# Patient Record
Sex: MALE | Race: WHITE | NOT HISPANIC OR LATINO | URBAN - METROPOLITAN AREA
[De-identification: names, ages, dates, MRNs, and addresses within clinical notes are randomized per-mention and may not be internally consistent; named-entity substitution may affect disease eponyms.]

---

## 2023-04-28 ENCOUNTER — OFFICE VISIT (OUTPATIENT)
Dept: INTERNAL MEDICINE | Facility: CLINIC | Age: 23
End: 2023-04-28
Payer: COMMERCIAL

## 2023-04-28 VITALS
TEMPERATURE: 98 F | RESPIRATION RATE: 20 BRPM | BODY MASS INDEX: 17.17 KG/M2 | HEIGHT: 72 IN | HEART RATE: 83 BPM | OXYGEN SATURATION: 98 % | DIASTOLIC BLOOD PRESSURE: 70 MMHG | WEIGHT: 126.75 LBS | SYSTOLIC BLOOD PRESSURE: 110 MMHG

## 2023-04-28 DIAGNOSIS — Z76.89 ENCOUNTER TO ESTABLISH CARE: Primary | ICD-10-CM

## 2023-04-28 DIAGNOSIS — Z00.00 ANNUAL PHYSICAL EXAM: ICD-10-CM

## 2023-04-28 PROCEDURE — 3008F BODY MASS INDEX DOCD: CPT | Mod: CPTII,S$GLB,, | Performed by: FAMILY MEDICINE

## 2023-04-28 PROCEDURE — 99999 PR PBB SHADOW E&M-NEW PATIENT-LVL III: ICD-10-PCS | Mod: PBBFAC,,, | Performed by: FAMILY MEDICINE

## 2023-04-28 PROCEDURE — 3074F SYST BP LT 130 MM HG: CPT | Mod: CPTII,S$GLB,, | Performed by: FAMILY MEDICINE

## 2023-04-28 PROCEDURE — 3078F DIAST BP <80 MM HG: CPT | Mod: CPTII,S$GLB,, | Performed by: FAMILY MEDICINE

## 2023-04-28 PROCEDURE — 99999 PR PBB SHADOW E&M-NEW PATIENT-LVL III: CPT | Mod: PBBFAC,,, | Performed by: FAMILY MEDICINE

## 2023-04-28 PROCEDURE — 99385 PREV VISIT NEW AGE 18-39: CPT | Mod: S$GLB,,, | Performed by: FAMILY MEDICINE

## 2023-04-28 PROCEDURE — 3074F PR MOST RECENT SYSTOLIC BLOOD PRESSURE < 130 MM HG: ICD-10-PCS | Mod: CPTII,S$GLB,, | Performed by: FAMILY MEDICINE

## 2023-04-28 PROCEDURE — 99385 PR PREVENTIVE VISIT,NEW,18-39: ICD-10-PCS | Mod: S$GLB,,, | Performed by: FAMILY MEDICINE

## 2023-04-28 PROCEDURE — 1159F MED LIST DOCD IN RCRD: CPT | Mod: CPTII,S$GLB,, | Performed by: FAMILY MEDICINE

## 2023-04-28 PROCEDURE — 1159F PR MEDICATION LIST DOCUMENTED IN MEDICAL RECORD: ICD-10-PCS | Mod: CPTII,S$GLB,, | Performed by: FAMILY MEDICINE

## 2023-04-28 PROCEDURE — 3078F PR MOST RECENT DIASTOLIC BLOOD PRESSURE < 80 MM HG: ICD-10-PCS | Mod: CPTII,S$GLB,, | Performed by: FAMILY MEDICINE

## 2023-04-28 PROCEDURE — 3008F PR BODY MASS INDEX (BMI) DOCUMENTED: ICD-10-PCS | Mod: CPTII,S$GLB,, | Performed by: FAMILY MEDICINE

## 2023-04-28 RX ORDER — DICYCLOMINE HYDROCHLORIDE 20 MG/1
20 TABLET ORAL EVERY 6 HOURS PRN
COMMUNITY
Start: 2022-10-31 | End: 2023-06-20 | Stop reason: SDUPTHER

## 2023-04-28 NOTE — PROGRESS NOTES
Subjective:       Patient ID: Pelon Stokes is a 23 y.o. male.    Chief Complaint: Abdominal Pain and Establish Care    Abdominal Pain      22 yo M    Past Surgical History:   Procedure Laterality Date    APPENDECTOMY       Family History   Problem Relation Age of Onset    Lung cancer Maternal Grandmother         nonsmoker    Brain cancer Maternal Grandfather     COPD Paternal Grandmother      Social History     Tobacco Use   Smoking Status Some Days    Types: Vaping with nicotine    Start date: 2018   Smokeless Tobacco Never     Not heavy drinker - occasional    No drugs    Wt Readings from Last 5 Encounters:   04/28/23 57.5 kg (126 lb 12.2 oz)     For further HPI details, see assessment and plan.    Review of Systems   Gastrointestinal:  Positive for abdominal pain.     Objective:      Vitals:    04/28/23 1517   BP: 110/70   Pulse: 83   Resp: 20   Temp: 98.3 °F (36.8 °C)       Physical Exam  Constitutional:       General: He is not in acute distress.     Appearance: He is not ill-appearing.   Pulmonary:      Effort: Pulmonary effort is normal. No respiratory distress.   Neurological:      General: No focal deficit present.      Mental Status: He is alert.   Psychiatric:         Mood and Affect: Mood normal.         Behavior: Behavior normal.       Assessment:       1. Encounter to establish care    2. Annual physical exam        Plan:   Encounter to establish care  -     CBC Auto Differential; Future; Expected date: 04/28/2023  -     Comprehensive Metabolic Panel; Future; Expected date: 04/28/2023  -     Hemoglobin A1C; Future; Expected date: 04/28/2023  -     Lipid Panel; Future; Expected date: 04/28/2023  -     TSH; Future; Expected date: 04/28/2023  -     HIV 1/2 Ag/Ab (4th Gen); Future; Expected date: 04/28/2023  -     Hepatitis C Antibody; Future; Expected date: 04/28/2023    Annual physical exam    Body mass index 17.19.  Longstanding history of low body weight.  Not overly concerning given  chronic nature.  Encourage aim for a few lb heavier so BMI will least be over 18.  Discussed physical activity with emphasis on resistance training.  Discussed CDC physical activity guidelines.    No mental health concerns.    Getting  in July.  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    Abdominal Pain  2-3 episodes in past 1-2 years  Lower cramping  Feels like needs to have a BM.  Feels like a constipation but w/out the actual constipation issues.  Quite severe in nature.  Once he is able to go to bathroom he is relieved.  Last episode was around November.  He was given bentyl  Uses it on a prn basis.  Plan to continue  If increased frequency or changes or recurrence plan further evaluation.    He'll read up on HPV vaccine and let me know if desires such.    12 month      This note was verbally dictated, please excuse any type errors.

## 2023-05-01 ENCOUNTER — LAB VISIT (OUTPATIENT)
Dept: LAB | Facility: HOSPITAL | Age: 23
End: 2023-05-01
Attending: FAMILY MEDICINE
Payer: COMMERCIAL

## 2023-05-01 DIAGNOSIS — Z76.89 ENCOUNTER TO ESTABLISH CARE: ICD-10-CM

## 2023-05-01 LAB
ALBUMIN SERPL BCP-MCNC: 4 G/DL (ref 3.5–5.2)
ALP SERPL-CCNC: 65 U/L (ref 55–135)
ALT SERPL W/O P-5'-P-CCNC: 15 U/L (ref 10–44)
ANION GAP SERPL CALC-SCNC: 7 MMOL/L (ref 8–16)
AST SERPL-CCNC: 17 U/L (ref 10–40)
BASOPHILS # BLD AUTO: 0.05 K/UL (ref 0–0.2)
BASOPHILS NFR BLD: 1 % (ref 0–1.9)
BILIRUB SERPL-MCNC: 1.2 MG/DL (ref 0.1–1)
BUN SERPL-MCNC: 15 MG/DL (ref 6–20)
CALCIUM SERPL-MCNC: 8.6 MG/DL (ref 8.7–10.5)
CHLORIDE SERPL-SCNC: 107 MMOL/L (ref 95–110)
CHOLEST SERPL-MCNC: 125 MG/DL (ref 120–199)
CHOLEST/HDLC SERPL: 2.1 {RATIO} (ref 2–5)
CO2 SERPL-SCNC: 24 MMOL/L (ref 23–29)
CREAT SERPL-MCNC: 1 MG/DL (ref 0.5–1.4)
DIFFERENTIAL METHOD: ABNORMAL
EOSINOPHIL # BLD AUTO: 0.2 K/UL (ref 0–0.5)
EOSINOPHIL NFR BLD: 3.6 % (ref 0–8)
ERYTHROCYTE [DISTWIDTH] IN BLOOD BY AUTOMATED COUNT: 12.2 % (ref 11.5–14.5)
EST. GFR  (NO RACE VARIABLE): >60 ML/MIN/1.73 M^2
ESTIMATED AVG GLUCOSE: 103 MG/DL (ref 68–131)
GLUCOSE SERPL-MCNC: 93 MG/DL (ref 70–110)
HBA1C MFR BLD: 5.2 % (ref 4–5.6)
HCT VFR BLD AUTO: 40.7 % (ref 40–54)
HCV AB SERPL QL IA: NEGATIVE
HDLC SERPL-MCNC: 60 MG/DL (ref 40–75)
HDLC SERPL: 48 % (ref 20–50)
HGB BLD-MCNC: 13.9 G/DL (ref 14–18)
HIV 1+2 AB+HIV1 P24 AG SERPL QL IA: NEGATIVE
IMM GRANULOCYTES # BLD AUTO: 0.01 K/UL (ref 0–0.04)
IMM GRANULOCYTES NFR BLD AUTO: 0.2 % (ref 0–0.5)
LDLC SERPL CALC-MCNC: 54.8 MG/DL (ref 63–159)
LYMPHOCYTES # BLD AUTO: 1.4 K/UL (ref 1–4.8)
LYMPHOCYTES NFR BLD: 28.5 % (ref 18–48)
MCH RBC QN AUTO: 30.1 PG (ref 27–31)
MCHC RBC AUTO-ENTMCNC: 34.2 G/DL (ref 32–36)
MCV RBC AUTO: 88 FL (ref 82–98)
MONOCYTES # BLD AUTO: 0.3 K/UL (ref 0.3–1)
MONOCYTES NFR BLD: 6.3 % (ref 4–15)
NEUTROPHILS # BLD AUTO: 3.1 K/UL (ref 1.8–7.7)
NEUTROPHILS NFR BLD: 60.4 % (ref 38–73)
NONHDLC SERPL-MCNC: 65 MG/DL
NRBC BLD-RTO: 0 /100 WBC
PLATELET # BLD AUTO: 183 K/UL (ref 150–450)
PMV BLD AUTO: 9.9 FL (ref 9.2–12.9)
POTASSIUM SERPL-SCNC: 3.9 MMOL/L (ref 3.5–5.1)
PROT SERPL-MCNC: 6.4 G/DL (ref 6–8.4)
RBC # BLD AUTO: 4.62 M/UL (ref 4.6–6.2)
SODIUM SERPL-SCNC: 138 MMOL/L (ref 136–145)
TRIGL SERPL-MCNC: 51 MG/DL (ref 30–150)
TSH SERPL DL<=0.005 MIU/L-ACNC: 1.09 UIU/ML (ref 0.4–4)
WBC # BLD AUTO: 5.06 K/UL (ref 3.9–12.7)

## 2023-05-01 PROCEDURE — 84443 ASSAY THYROID STIM HORMONE: CPT | Performed by: FAMILY MEDICINE

## 2023-05-01 PROCEDURE — 83036 HEMOGLOBIN GLYCOSYLATED A1C: CPT | Performed by: FAMILY MEDICINE

## 2023-05-01 PROCEDURE — 80061 LIPID PANEL: CPT | Performed by: FAMILY MEDICINE

## 2023-05-01 PROCEDURE — 85025 COMPLETE CBC W/AUTO DIFF WBC: CPT | Performed by: FAMILY MEDICINE

## 2023-05-01 PROCEDURE — 87389 HIV-1 AG W/HIV-1&-2 AB AG IA: CPT | Performed by: FAMILY MEDICINE

## 2023-05-01 PROCEDURE — 36415 COLL VENOUS BLD VENIPUNCTURE: CPT | Performed by: FAMILY MEDICINE

## 2023-05-01 PROCEDURE — 80053 COMPREHEN METABOLIC PANEL: CPT | Performed by: FAMILY MEDICINE

## 2023-05-01 PROCEDURE — 86803 HEPATITIS C AB TEST: CPT | Performed by: FAMILY MEDICINE

## 2023-05-02 DIAGNOSIS — E83.51 HYPOCALCEMIA: Primary | ICD-10-CM

## 2023-06-20 ENCOUNTER — PATIENT MESSAGE (OUTPATIENT)
Dept: INTERNAL MEDICINE | Facility: CLINIC | Age: 23
End: 2023-06-20
Payer: COMMERCIAL

## 2023-06-20 RX ORDER — DICYCLOMINE HYDROCHLORIDE 20 MG/1
20 TABLET ORAL EVERY 6 HOURS PRN
Qty: 90 TABLET | Refills: 0 | Status: SHIPPED | OUTPATIENT
Start: 2023-06-20

## 2023-07-17 ENCOUNTER — HOSPITAL ENCOUNTER (OUTPATIENT)
Dept: RADIOLOGY | Facility: HOSPITAL | Age: 23
Discharge: HOME | End: 2023-07-17
Attending: ORTHOPAEDIC SURGERY
Payer: COMMERCIAL

## 2023-07-17 DIAGNOSIS — M79.646 THUMB PAIN, UNSPECIFIED LATERALITY: Primary | ICD-10-CM

## 2023-07-17 DIAGNOSIS — M79.646 THUMB PAIN, UNSPECIFIED LATERALITY: ICD-10-CM

## 2023-07-17 PROCEDURE — 73130 X-RAY EXAM OF HAND: CPT | Mod: TC,RT

## 2023-07-17 PROCEDURE — 73140 XR FINGER 2 OR MORE VIEWS RIGHT: ICD-10-PCS | Mod: 26,RT,, | Performed by: RADIOLOGY

## 2023-07-17 PROCEDURE — 73140 X-RAY EXAM OF FINGER(S): CPT | Mod: 26,RT,, | Performed by: RADIOLOGY

## 2023-07-17 PROCEDURE — 73140 X-RAY EXAM OF FINGER(S): CPT | Mod: TC,RT

## 2023-10-23 ENCOUNTER — CLINICAL SUPPORT (OUTPATIENT)
Dept: REHABILITATION | Facility: HOSPITAL | Age: 23
End: 2023-10-23
Payer: COMMERCIAL

## 2023-10-23 DIAGNOSIS — M54.2 CERVICALGIA: Primary | ICD-10-CM

## 2023-10-23 DIAGNOSIS — M62.50 MUSCLE WASTING AND ATROPHY, NOT ELSEWHERE CLASSIFIED, UNSPECIFIED SITE: ICD-10-CM

## 2023-10-23 DIAGNOSIS — M53.84 DECREASED ROM OF THORACIC SPINE: ICD-10-CM

## 2023-10-23 PROCEDURE — 97140 MANUAL THERAPY 1/> REGIONS: CPT

## 2023-10-23 PROCEDURE — 97110 THERAPEUTIC EXERCISES: CPT

## 2023-10-23 PROCEDURE — 97161 PT EVAL LOW COMPLEX 20 MIN: CPT

## 2023-10-23 NOTE — PLAN OF CARE
[facility]   Physical Therapy Initial Evaluation        Date: 10/23/2023   Name: Sina Stokes  Clinic Number: [id number]    Therapy Diagnosis:   Encounter Diagnoses   Name Primary?    Cervicalgia Yes    Decreased ROM of thoracic spine     Muscle wasting and atrophy, not elsewhere classified, unspecified site      Physician: Self, Aaareferral    Evaluation Date: 10/23/2023  Authorization Period Expiration: 10/24/24  Plan of Care Expiration: 1/16/24  Visit # / Visits authorized: 1/ 1    Progress Note Due: 11/23/23    Precautions: Standard    Time In: 4:25 pm   Time Out: 5:00 pm   Total Appointment Time (timed & untimed codes): 35 minutes    Surgery: none    Subjective   Date of onset: approximately 1 year ago  History of current condition - Pelon reports: L sided neck pain at his Levator Scapulae/Serratus Posterior Superior. Patient states his pain seems to be getting worse.     Falls: none    Pain:  Current 4/10, worst 7/10, best 0/10   Location: L neck/shoulder blade  Description: sharp with movement, dull to touch  Aggravating Factors: looking down, looking all the way to the L  Easing Factors: heat, massage      Medical History:   No past medical history on file.    Surgical History:   Sina Stokes  has no past surgical history on file.    Medications:   Sina Beavers currently has no medications in their medication list.    Allergies:   Review of patient's allergies indicates:  Not on File     Imaging, none    Prior Therapy: none  Social History:  lives with their spouse  Occupation: ultrasound technician  Prior Level of Function: Patient was independent with all ADL's.   Current Level of Function: Patient experiences moderate difficulty with participation in their typical ADL's.       Objective       ROM  *denotes pain     Cervical ROM  (degrees) Right Left Goal   Cervical Flexion 50* 60   Cervical Extension 60 -   Cervical Sidebending 45 45 -   Cervical Rotation  70  "70* 70       Thoracic ROM  (Quality) Right Left Goal   Flexion  Funcitonal Nonpainful  -   Extension  Dysfunctional Nonpainful  Funcitonal Nonpainful    Rotation Functional Painful  Functional Painful  Funcitonal Nonpainful          Strength  *denotes pain     MMT Right    Left Goal   Deep Neck Flexor Endurance Test 3 seconds 20 seconds   Shoulder Flexion 5/5 5/5 -   Shoulder Abduction 5/5 5/5 -   Elbow Flexion  5/5 5/5 -   Elbow Extension  5/5 5/5 -   Wrist Flexion  5/5 5/5 -   Wrist Extension  5/5 5/5 -   Thumb Extension 5/5 5/5 -   Rhomboids 5/5 5/5 -   Lower Trapezius  4+/5 4+/5 5/5   Latts  5/5 5/5 -            Intake Outcome Measure for FOTO Neck Survey    Therapist reviewed FOTO scores for Sina Stokes on 10/23/2023.   FOTO report - see Media section or FOTO account episode details.    Intake Score: 72%         TREATMENT         MANUAL THERAPY TECHNIQUES to improve pain, ROM for (10) minutes including:  Intervention Performed Today     Seated C-T HVLA x    Seated Thoracic HVLA x    Cervical HVLA x    Astym     Functional Dry Needling      Cervical Distraction              THERAPEUTIC EXERCISES to develop strength, endurance, ROM, flexibility, posture, and core stabilization for (10) minutes including:  Intervention Performed Today     Chin Tuck  x  Supine, 3" hold, 1x10   Rows  x  10#, 1x10   Bilateral External Rotation  x  Green Band, 2x10          Home Exercises and Patient Education Provided    Education provided:   - reviewed for understanding    Written Home Exercises Provided: Patient instructed to cont prior HEP.  Exercises were reviewed and Pelon was able to demonstrate them prior to the end of the session.  Pelon demonstrated good  understanding of the education provided.     See EMR under Patient Instructions for exercises provided 10/23/2023.    Assessment   Sina Beavers presents with impairments which include impairments list: ROM, strength, and muscle length. Pt prognosis is " Excellent due to personal factors and co-morbidities listed below. Pt will benefit from skilled outpatient Physical Therapy to address the deficits stated above and in the chart below, provide pt/family education, and to maximize pt's level of independence.     Plan of care discussed with patient: Yes  Pt's spiritual, cultural and educational needs considered and patient is agreeable to the plan of care and goals as stated below:     Anticipated Barriers for therapy: none    Medical Necessity is demonstrated by the following  History  Co-morbidities and personal factors that may impact the plan of care [] LOW: no personal factors / co-morbidities  [x] MODERATE: 1-2 personal factors / co-morbidities  [] HIGH: 3+ personal factors / co-morbidities    Moderate / High Support Documentation: See Past Medical History     Examination  Body Structures and Functions, activity limitations and participation restrictions that may impact the plan of care [] LOW: addressing 1-2 elements  [x] MODERATE: 3+ elements  [] HIGH: 4+ elements (please support below)    Moderate / High Support Documentation: See Evaluation Above     Clinical Presentation [] LOW: stable  [x] MODERATE: Evolving  [] HIGH: Unstable     Decision Making/ Complexity Score: low       GOALS:    Short Term Goals:  6 weeks Progress      Patient will report decreased pain to <5/10 at worst on VAS to progress toward Prior Level of Function.    Patient will report improved function by a score of >80 out of 100 on FOTO.    Patient will improve AROM to 50% of stated goals in order to progress towards Prior Level of Function.    Patient will improve strength to 50% of stated goals in order to progress towards Prior Level of Function.      Long Term Goals:  12 weeks Progress     Patient will report decreased pain to <3/10 at worst on VAS to progress toward Prior Level of Function.      Patient will report improved function by a score of >90 out of 100 on FOTO.    Patient  will improve ROM to stated goals to return to patient to Prior Level of Function.    Patient will improve strength to stated goals in order to return patient  to Prior Level of Function.          Plan   Plan of care Certification: 10/23/2023 to 1/16/24.    Outpatient Physical Therapy 3 times weekly for 12 weeks to include the following interventions: Manual Therapy, Neuromuscular Re-ed, Therapeutic Activities, and Therapeutic Exercise.     Daren Crowe, PT, DPT, SCS

## 2023-10-24 PROBLEM — M53.84 DECREASED ROM OF THORACIC SPINE: Status: ACTIVE | Noted: 2023-10-24

## 2023-10-24 PROBLEM — M54.2 CERVICALGIA: Status: ACTIVE | Noted: 2023-10-24

## 2023-10-24 PROBLEM — M62.50 MUSCLE WASTING AND ATROPHY, NOT ELSEWHERE CLASSIFIED, UNSPECIFIED SITE: Status: ACTIVE | Noted: 2023-10-24

## 2023-11-01 ENCOUNTER — CLINICAL SUPPORT (OUTPATIENT)
Dept: REHABILITATION | Facility: HOSPITAL | Age: 23
End: 2023-11-01
Payer: COMMERCIAL

## 2023-11-01 DIAGNOSIS — M54.2 CERVICALGIA: Primary | ICD-10-CM

## 2023-11-01 DIAGNOSIS — M53.84 DECREASED ROM OF THORACIC SPINE: ICD-10-CM

## 2023-11-01 DIAGNOSIS — M62.50 MUSCLE WASTING AND ATROPHY, NOT ELSEWHERE CLASSIFIED, UNSPECIFIED SITE: ICD-10-CM

## 2023-11-01 PROCEDURE — 97110 THERAPEUTIC EXERCISES: CPT

## 2023-11-01 PROCEDURE — 97112 NEUROMUSCULAR REEDUCATION: CPT

## 2023-11-01 PROCEDURE — 97140 MANUAL THERAPY 1/> REGIONS: CPT

## 2023-11-01 NOTE — PROGRESS NOTES
"[facility]   Physical Therapy Treatment Note     Name: Sina Beavers Stokes  Clinic Number: [id number]    Therapy Diagnosis:   Encounter Diagnoses   Name Primary?    Cervicalgia Yes    Decreased ROM of thoracic spine     Muscle wasting and atrophy, not elsewhere classified, unspecified site      Physician: Self, Aaareferral    Visit Date: 11/1/2023    Evaluation Date: 10/23/2023  Authorization Period Expiration: 10/24/24  Plan of Care Expiration: 1/16/24  Visit # / Visits authorized: 1/20 (+1 for evaluation)    Progress Note Due: 11/23/23    Precautions: Standard    PTA Visit #: 0/5     Time In: 3:05 pm   Time Out: 4:06 pm   Total Billable Time: 55 minutes    SUBJECTIVE     Pt reports: decreased pain and tightness.   He was compliant with home exercise program.  Response to previous treatment: patient demonstrated understanding of HEP.   Functional change: patient reported independence with HEP.     Pain:  Current 4/10, worst 7/10, best 0/10   Location: L neck/shoulder blade    OBJECTIVE     Objective Measures updated at progress report unless specified.     Treatment     Pelon received the treatments listed below:        MANUAL THERAPY TECHNIQUES to improve pain, ROM for (15) minutes including:  Intervention Performed Today     Seated C-T HVLA     Seated Thoracic HVLA     Cervical HVLA     Astym x  C-sp and L-sp   Functional Dry Needling      Cervical Distraction              Pelon received THERAPEUTIC EXERCISES to develop strength, endurance, ROM, flexibility, posture, and core stabilization for (15) minutes including:  Intervention Performed Today     UBE x  2' forward, 2' backward   Foam Roll Series  x  Positions 1-7: 10x each   Chin Tuck  x  Supine, 3" hold, 1x10   Rows    10#, 1x10   Bilateral External Rotation    Green Band, 2x10    Mckinney Carry  x  20# kb, 2 laps    Bird Dogs x  2x10           NEUROMUSCULAR RE-EDUCATION ACTIVITIES to improve Balance, Coordination, " Kinesthetic, Sense, Proprioception, and Posture for (25) minutes.  The following were included:  Intervention Performed Today     Rows - seated  x  10#, 3x10   Lat Pulldowns - seated  x  15#, 2x10   Bilateral External Rotation  x  Blue Band, 2x10   Standing Serratus Punch x  Blue Band, 2x10         Patient Education and Home Exercises     Home Exercises Provided and Patient Education Provided     Education provided:   - reviewed for understanding    Written Home Exercises Provided: Patient instructed to cont prior HEP. Exercises were reviewed and Pelon was able to demonstrate them prior to the end of the session.  Pelon demonstrated good  understanding of the education provided. See EMR under Patient Instructions for exercises provided during therapy sessions    ASSESSMENT   Patient reported significantly improved soft tissue mobility after Manual Therapy. Progressed Therapeutic Exercise by adding Foam Roll Series to improve cervical and thoracic ROM and added Mckinney Carry and Bird Dogs to improve core strength. Progressed Neuromuscular Re-education by adding Lat Pulldowns and Standing Serratus Punch to improve Lower Trapezius and Serratus Anterior motor control. Patient tolerated today's session well without complaint of pain.     Pelon Is progressing well towards his goals.   Pt prognosis is Excellent.     Pt will continue to benefit from skilled outpatient physical therapy to address the deficits listed in the problem list box on initial evaluation, provide pt/family education and to maximize pt's level of independence in the home and community environment.     Pt's spiritual, cultural and educational needs considered and pt agreeable to plan of care and goals.     Anticipated barriers to physical therapy: none    GOALS:    Short Term Goals:  6 weeks Progress      Patient will report decreased pain to <5/10 at worst on VAS to progress toward Prior Level of Function. Progressing    Patient  will report improved function by a score of >80 out of 100 on FOTO.    Patient will improve AROM to 50% of stated goals in order to progress towards Prior Level of Function.    Patient will improve strength to 50% of stated goals in order to progress towards Prior Level of Function.      Long Term Goals:  12 weeks Progress     Patient will report decreased pain to <3/10 at worst on VAS to progress toward Prior Level of Function.      Patient will report improved function by a score of >90 out of 100 on FOTO.    Patient will improve ROM to stated goals to return to patient to Prior Level of Function.    Patient will improve strength to stated goals in order to return patient  to Prior Level of Function.        PLAN   Continue Plan of Care (POC) and progress per patient tolerance. See treatment section for details on planned progressions next session.      Daren Crowe, PT, DPT, SCS

## 2023-11-07 ENCOUNTER — CLINICAL SUPPORT (OUTPATIENT)
Dept: REHABILITATION | Facility: HOSPITAL | Age: 23
End: 2023-11-07
Payer: COMMERCIAL

## 2023-11-07 DIAGNOSIS — M53.84 DECREASED ROM OF THORACIC SPINE: ICD-10-CM

## 2023-11-07 DIAGNOSIS — M54.2 CERVICALGIA: Primary | ICD-10-CM

## 2023-11-07 DIAGNOSIS — M62.50 MUSCLE WASTING AND ATROPHY, NOT ELSEWHERE CLASSIFIED, UNSPECIFIED SITE: ICD-10-CM

## 2023-11-07 PROCEDURE — 97110 THERAPEUTIC EXERCISES: CPT

## 2023-11-07 PROCEDURE — 97112 NEUROMUSCULAR REEDUCATION: CPT

## 2023-11-07 PROCEDURE — 97140 MANUAL THERAPY 1/> REGIONS: CPT

## 2023-11-07 NOTE — PROGRESS NOTES
"[facility]   Physical Therapy Treatment Note     Name: Sina Beavers Stokes  Clinic Number: [id number]    Therapy Diagnosis:   Encounter Diagnoses   Name Primary?    Cervicalgia Yes    Decreased ROM of thoracic spine     Muscle wasting and atrophy, not elsewhere classified, unspecified site        Physician: Self, Aaareferral    Visit Date: 11/7/2023    Evaluation Date: 10/23/2023  Authorization Period Expiration: 10/24/24  Plan of Care Expiration: 1/16/24  Visit # / Visits authorized: 2/20 (+1 for evaluation)    Progress Note Due: 11/23/23    Precautions: Standard    PTA Visit #: 0/5     Time In: 3:12 pm   Time Out: 4:05 pm   Total Billable Time: 50 minutes    SUBJECTIVE     Pt reports: mild tightness today due to driving for prolonged periods of time over the weekend.   He was compliant with home exercise program.  Response to previous treatment: significantly improved soft tissue mobility.    Functional change: decreased stiffness during ADL's.     Pain:  Current 3/10, worst 7/10, best 0/10   Location: L neck/shoulder blade    OBJECTIVE     Objective Measures updated at progress report unless specified.     Treatment     Pelon received the treatments listed below:        MANUAL THERAPY TECHNIQUES to improve pain, ROM for (15) minutes including:  Intervention Performed Today     Seated C-T HVLA x    Seated Thoracic HVLA x    Cervical HVLA x    Astym   C-sp and L-sp   Active Release Technique  x  Upper Trapezius, levator scapulae, erector spinae, rhomboids    Functional Dry Needling      Cervical Distraction              Pelon received THERAPEUTIC EXERCISES to develop strength, endurance, ROM, flexibility, posture, and core stabilization for (15) minutes including:  Intervention Performed Today     UBE x  2' forward, 2' backward   Foam Roll Series  x  Positions 1-7: 10x each   Chin Tuck  x  Supine, 5" hold, 1x10   Farmer Carry  x  30# kb, 3 laps    Bird Dogs x  3x10 "           NEUROMUSCULAR RE-EDUCATION ACTIVITIES to improve Balance, Coordination, Kinesthetic, Sense, Proprioception, and Posture for (20) minutes.  The following were included:  Intervention Performed Today     Rows - seated  x  10#, 3x10   Lat Pulldowns - seated  x  15#, 3x10   Bilateral External Rotation  x  Blue Band, 3x10   Standing Serratus Punch x  Blue Band, 3x10         Patient Education and Home Exercises     Home Exercises Provided and Patient Education Provided     Education provided:   - reviewed for understanding    Written Home Exercises Provided: Patient instructed to cont prior HEP. Exercises were reviewed and Pelon was able to demonstrate them prior to the end of the session.  Pelon demonstrated good  understanding of the education provided. See EMR under Patient Instructions for exercises provided during therapy sessions    ASSESSMENT   Patient reported significantly improved cervical/thoracic mobility after Manual Therapy. Progressed Therapeutic Exercise by increasing hold time on Chin Tuck, resistance on Farmer Walk, and sets of Bird Dogs to improve deep neck flexor and core strength. Progressed Neuromuscular Re-education by increasing sets of Lat Pulldowns, Bilateral External Rotation, and Standing Serratus Punch to improve motor control of scapular stabilizers. Patient tolerated today's session well without complaint of pain.     Pelon Is progressing well towards his goals.   Pt prognosis is Excellent.     Pt will continue to benefit from skilled outpatient physical therapy to address the deficits listed in the problem list box on initial evaluation, provide pt/family education and to maximize pt's level of independence in the home and community environment.     Pt's spiritual, cultural and educational needs considered and pt agreeable to plan of care and goals.     Anticipated barriers to physical therapy: none    GOALS:    Short Term Goals:  6 weeks Progress      Patient  will report decreased pain to <5/10 at worst on VAS to progress toward Prior Level of Function. Progressing    Patient will report improved function by a score of >80 out of 100 on FOTO.    Patient will improve AROM to 50% of stated goals in order to progress towards Prior Level of Function.    Patient will improve strength to 50% of stated goals in order to progress towards Prior Level of Function.      Long Term Goals:  12 weeks Progress     Patient will report decreased pain to <3/10 at worst on VAS to progress toward Prior Level of Function.      Patient will report improved function by a score of >90 out of 100 on FOTO.    Patient will improve ROM to stated goals to return to patient to Prior Level of Function.    Patient will improve strength to stated goals in order to return patient  to Prior Level of Function.        PLAN   Continue Plan of Care (POC) and progress per patient tolerance. See treatment section for details on planned progressions next session.      Daren Crowe, PT, DPT, SCS

## 2023-11-14 ENCOUNTER — CLINICAL SUPPORT (OUTPATIENT)
Dept: REHABILITATION | Facility: HOSPITAL | Age: 23
End: 2023-11-14
Payer: COMMERCIAL

## 2023-11-14 DIAGNOSIS — M53.84 DECREASED ROM OF THORACIC SPINE: ICD-10-CM

## 2023-11-14 DIAGNOSIS — M54.2 CERVICALGIA: Primary | ICD-10-CM

## 2023-11-14 DIAGNOSIS — M62.50 MUSCLE WASTING AND ATROPHY, NOT ELSEWHERE CLASSIFIED, UNSPECIFIED SITE: ICD-10-CM

## 2023-11-14 PROCEDURE — 97112 NEUROMUSCULAR REEDUCATION: CPT

## 2023-11-14 PROCEDURE — 97140 MANUAL THERAPY 1/> REGIONS: CPT

## 2023-11-14 PROCEDURE — 97110 THERAPEUTIC EXERCISES: CPT

## 2023-11-14 NOTE — PROGRESS NOTES
"[facility]   Physical Therapy Treatment Note     Name: Sina Beavers Stokes  [facility] Number: [id number]    Therapy Diagnosis:   Encounter Diagnoses   Name Primary?    Cervicalgia Yes    Decreased ROM of thoracic spine     Muscle wasting and atrophy, not elsewhere classified, unspecified site          Physician: Self, Aaareferral    Visit Date: 11/14/2023    Evaluation Date: 10/23/2023  Authorization Period Expiration: 10/24/24  Plan of Care Expiration: 1/16/24  Visit # / Visits authorized: 3/20 (+1 for evaluation)    Progress Note Due: 11/23/23    Precautions: Standard    PTA Visit #: 0/5     Time In: 7:00 am   Time Out: 7:45 am   Total Billable Time: 40 minutes    SUBJECTIVE     Pt reports: mild pain at the medial border of his L scapula.   He was compliant with home exercise program.  Response to previous treatment: significantly improved soft tissue mobility.    Functional change: significantly decreased pain with ADL's.     Pain:  Current 3/10, worst 7/10  Location: L neck/shoulder blade    OBJECTIVE     Objective Measures updated at progress report unless specified.     Treatment     Pelon received the treatments listed below:        MANUAL THERAPY TECHNIQUES to improve pain, ROM for (15) minutes including:  Intervention Performed Today     Seated C-T HVLA     Seated Thoracic HVLA     Cervical HVLA     Astym   C-sp and L-sp   Active Release Technique    Upper Trapezius, levator scapulae, erector spinae, rhomboids    Functional Dry Needling  x  Upper Trapezius, Levator Scapulae, Rhomboids, and Thoracic Erector Spinae    Cervical Distraction              Pelon received THERAPEUTIC EXERCISES to develop strength, endurance, ROM, flexibility, posture, and core stabilization for (15) minutes including:  Intervention Performed Today     UBE x  3' forward, 3' backward   Foam Roll Series  x  Positions 1-7: 10x each   Chin Tuck with Head Lift  x  Supine, 10" hold, 1x10   Farmer Carry "  x  30# kb, 3 laps    Bird Dogs x  3x10           NEUROMUSCULAR RE-EDUCATION ACTIVITIES to improve Balance, Coordination, Kinesthetic, Sense, Proprioception, and Posture for (10) minutes.  The following were included:  Intervention Performed Today     Rows - seated  x  10#, 3x10   Lat Pulldowns - seated  x  15#, 3x10   Bilateral External Rotation    Blue Band, 3x10   Standing Serratus Punch   Blue Band, 3x10   Bent-Over Rows    **add next visit          Patient Education and Home Exercises     Home Exercises Provided and Patient Education Provided     Education provided:   - reviewed for understanding    Written Home Exercises Provided: Patient instructed to cont prior HEP. Exercises were reviewed and Pelon was able to demonstrate them prior to the end of the session.  Pelon demonstrated good  understanding of the education provided. See EMR under Patient Instructions for exercises provided during therapy sessions    ASSESSMENT   Patient reported significantly improved soft tissue mobility and decreased pain after Manual Therapy. Progressed Therapeutic Exercise by increasing hold time on Chin Tuck with Head Lift to improve deep neck flexor endurance. Patient demonstrated improved independence on Neuromuscular Re-education by requiring fewer verbal cues. Patient tolerated today's session well without complaint of pain.       Pelon Is progressing well towards his goals.   Pt prognosis is Excellent.     Pt will continue to benefit from skilled outpatient physical therapy to address the deficits listed in the problem list box on initial evaluation, provide pt/family education and to maximize pt's level of independence in the home and community environment.     Pt's spiritual, cultural and educational needs considered and pt agreeable to plan of care and goals.     Anticipated barriers to physical therapy: none    GOALS:    Short Term Goals:  6 weeks Progress      Patient will report decreased pain  to <5/10 at worst on VAS to progress toward Prior Level of Function. Progressing    Patient will report improved function by a score of >80 out of 100 on FOTO.    Patient will improve AROM to 50% of stated goals in order to progress towards Prior Level of Function.    Patient will improve strength to 50% of stated goals in order to progress towards Prior Level of Function.      Long Term Goals:  12 weeks Progress     Patient will report decreased pain to <3/10 at worst on VAS to progress toward Prior Level of Function.      Patient will report improved function by a score of >90 out of 100 on FOTO.    Patient will improve ROM to stated goals to return to patient to Prior Level of Function.    Patient will improve strength to stated goals in order to return patient  to Prior Level of Function.        PLAN   Continue Plan of Care (POC) and progress per patient tolerance. See treatment section for details on planned progressions next session.      Daren Crowe, PT, DPT, SCS

## 2023-11-24 ENCOUNTER — CLINICAL SUPPORT (OUTPATIENT)
Dept: REHABILITATION | Facility: HOSPITAL | Age: 23
End: 2023-11-24
Payer: COMMERCIAL

## 2023-11-24 DIAGNOSIS — M62.50 MUSCLE WASTING AND ATROPHY, NOT ELSEWHERE CLASSIFIED, UNSPECIFIED SITE: ICD-10-CM

## 2023-11-24 DIAGNOSIS — M53.84 DECREASED ROM OF THORACIC SPINE: ICD-10-CM

## 2023-11-24 DIAGNOSIS — M54.2 CERVICALGIA: Primary | ICD-10-CM

## 2023-11-24 PROCEDURE — 97110 THERAPEUTIC EXERCISES: CPT

## 2023-11-24 PROCEDURE — 97112 NEUROMUSCULAR REEDUCATION: CPT

## 2023-11-24 NOTE — PROGRESS NOTES
[facility]   Physical Therapy Treatment Note + Discharge     Name: Sina Stokes  Clinic Number: [id number]    Therapy Diagnosis:   Encounter Diagnoses   Name Primary?    Cervicalgia Yes    Decreased ROM of thoracic spine     Muscle wasting and atrophy, not elsewhere classified, unspecified site            Physician: Self, Aaareferral    Visit Date: 11/24/2023    Evaluation Date: 10/23/2023  Authorization Period Expiration: 10/24/24  Plan of Care Expiration: 1/16/24  Visit # / Visits authorized: 4/20 (+1 for evaluation)    Progress Note Due: 12/24/23    Precautions: Standard    PTA Visit #: 0/5     Time In: 7:00 am   Time Out: 7:52 am   Total Billable Time: 50 minutes    SUBJECTIVE     Pt reports: no pain/stiffness this morning, patient states his symptoms are intermittent.   He was compliant with home exercise program.  Response to previous treatment: patient states he experiences some soreness after Functional Dry Needling last visit.   Functional change: decreased frequency and intensity of symptoms with his ADL's.     Pain:  Current 0/10, worst 5/10  Location: L neck/shoulder blade    OBJECTIVE     ROM  *denotes pain      Cervical ROM  (degrees) Right Left Goal   Cervical Flexion 55 60   Cervical Rotation  70 70 70         Thoracic ROM  (Quality) Right Left Goal   Extension  Funcitonal Nonpainful  Funcitonal Nonpainful    Rotation Funcitonal Nonpainful  Funcitonal Nonpainful  Funcitonal Nonpainful             Strength  *denotes pain      MMT Right     Left Goal   Deep Neck Flexor Endurance Test 10 seconds 20 seconds   Lower Trapezius  4+/5 4+/5 5/5               Intake Outcome Measure for FOTO Neck Survey     Therapist reviewed FOTO scores for Sina Stokes on 10/23/2023.   FOTO report - see Media section or FOTO account episode details.     Intake Score: 72%  Updated Score (11/24/23): 88%       Treatment     Pelon received the treatments listed below:        MANUAL  THERAPY TECHNIQUES to improve pain, ROM for (0) minutes including:  Intervention Performed Today     Seated C-T HVLA     Seated Thoracic HVLA     Cervical HVLA     Astym   C-sp and L-sp   Active Release Technique    Upper Trapezius, levator scapulae, erector spinae, rhomboids    Functional Dry Needling    Upper Trapezius, Levator Scapulae, Rhomboids, and Thoracic Erector Spinae    Cervical Distraction              Pelon received THERAPEUTIC EXERCISES to develop strength, endurance, ROM, flexibility, posture, and core stabilization for (35) minutes including:  Intervention Performed Today     UBE x  3' forward, 3' backward   Foam Roll Series  x  Positions 1-7: 10x each   Thoracic Around-the-World x  x  10# kb, 1x10 each direction   15# kb, 1x10 each direction   Updated goals, measurements, and FOTO for Progress Note  x  Visit 4           NEUROMUSCULAR RE-EDUCATION ACTIVITIES to improve Balance, Coordination, Kinesthetic, Sense, Proprioception, and Posture for (15) minutes.  The following were included:  Intervention Performed Today     Prone T's with Chin Tuck  x  Off table, 2x10    Prone Y's with Chin Tuck  x  Off table, 2x10    1/2 Kneeling Chops  x  10#, 2x10    1/2 Kneeling Lifts   x  7.5#, 2x10          Patient Education and Home Exercises     Home Exercises Provided and Patient Education Provided     Education provided:   - reviewed for understanding    Written Home Exercises Provided: Patient instructed to cont prior HEP. Exercises were reviewed and Pelon was able to demonstrate them prior to the end of the session.  Pelon demonstrated good  understanding of the education provided. See EMR under Patient Instructions for exercises provided during therapy sessions    ASSESSMENT   Updated goals, measurements, and FOTO during Therapeutic Exercise today for Discharge Note. Added Thoracic Around-the-Worlds to Therapeutic Exercise to improve thoracic ROM and shoulder strength. Progressed  Neuromuscular Re-education by adding multiple exercises per flowsheet to improve thoracic and cervical motor control. Patient has progressed well with Physical Therapy and is appropriate for discharge at this time.       Pelon Is progressing well towards his goals.   Pt prognosis is Excellent.     Pt will continue to benefit from skilled outpatient physical therapy to address the deficits listed in the problem list box on initial evaluation, provide pt/family education and to maximize pt's level of independence in the home and community environment.     Pt's spiritual, cultural and educational needs considered and pt agreeable to plan of care and goals.     Anticipated barriers to physical therapy: none    GOALS:    Short Term Goals:  6 weeks Progress      Patient will report decreased pain to <5/10 at worst on VAS to progress toward Prior Level of Function. Goal Met  11/24/23   Patient will report improved function by a score of >80 out of 100 on FOTO. Goal Met  11/24/23   Patient will improve AROM to 50% of stated goals in order to progress towards Prior Level of Function. Goal Met  11/24/23   Patient will improve strength to 50% of stated goals in order to progress towards Prior Level of Function. Goal Met  11/24/23     Long Term Goals:  12 weeks Progress     Patient will report decreased pain to <3/10 at worst on VAS to progress toward Prior Level of Function.   Not Met    Patient will report improved function by a score of >90 out of 100 on FOTO. Not Met    Patient will improve ROM to stated goals to return to patient to Prior Level of Function. Goal Met  11/24/23   Patient will improve strength to stated goals in order to return patient  to Prior Level of Function. Goal Met   11/24/23       PLAN   Patient is discharged.       Daren Crowe, PT, DPT, SCS

## 2023-11-24 NOTE — PLAN OF CARE
[facility] OUTPATIENT THERAPY AND WELLNESS  Physical Therapy Discharge Note    Name: Sina Stokes  [facility] Number: [id number]    Therapy Diagnosis:   Encounter Diagnoses   Name Primary?    Cervicalgia Yes    Decreased ROM of thoracic spine     Muscle wasting and atrophy, not elsewhere classified, unspecified site      Physician: Self, Aaareferral    Physician: Self, Aaareferral  Visit Date: 11/24/2023  Evaluation Date: 10/23/2023      Date of Last visit: 11/24/23  Total Visits Received: 5    ASSESSMENT      Patient has progressed well with Physical Therapy and is appropriate for discharge at this time.     Discharge reason: Patient has reached the maximum rehab potential for the present time    Discharge FOTO Score: 88    GOALS:     Short Term Goals:  6 weeks Progress       Patient will report decreased pain to <5/10 at worst on VAS to progress toward Prior Level of Function. Goal Met  11/24/23   Patient will report improved function by a score of >80 out of 100 on FOTO. Goal Met  11/24/23   Patient will improve AROM to 50% of stated goals in order to progress towards Prior Level of Function. Goal Met  11/24/23   Patient will improve strength to 50% of stated goals in order to progress towards Prior Level of Function. Goal Met  11/24/23      Long Term Goals:  12 weeks Progress      Patient will report decreased pain to <3/10 at worst on VAS to progress toward Prior Level of Function.   Not Met    Patient will report improved function by a score of >90 out of 100 on FOTO. Not Met    Patient will improve ROM to stated goals to return to patient to Prior Level of Function. Goal Met  11/24/23   Patient will improve strength to stated goals in order to return patient  to Prior Level of Function. Goal Met   11/24/23       PLAN   This patient is discharged from Physical Therapy.      Daren Crowe, PT, DPT, SCS

## 2024-04-29 ENCOUNTER — LAB VISIT (OUTPATIENT)
Dept: LAB | Facility: HOSPITAL | Age: 24
End: 2024-04-29
Attending: FAMILY MEDICINE
Payer: COMMERCIAL

## 2024-04-29 ENCOUNTER — OFFICE VISIT (OUTPATIENT)
Dept: INTERNAL MEDICINE | Facility: CLINIC | Age: 24
End: 2024-04-29
Payer: COMMERCIAL

## 2024-04-29 VITALS
WEIGHT: 133.69 LBS | DIASTOLIC BLOOD PRESSURE: 60 MMHG | HEIGHT: 72 IN | OXYGEN SATURATION: 98 % | HEART RATE: 103 BPM | SYSTOLIC BLOOD PRESSURE: 120 MMHG | BODY MASS INDEX: 18.11 KG/M2

## 2024-04-29 DIAGNOSIS — R39.15 URINARY URGENCY: ICD-10-CM

## 2024-04-29 DIAGNOSIS — E83.51 HYPOCALCEMIA: ICD-10-CM

## 2024-04-29 DIAGNOSIS — Z00.00 ANNUAL PHYSICAL EXAM: ICD-10-CM

## 2024-04-29 DIAGNOSIS — R39.15 URINARY URGENCY: Primary | ICD-10-CM

## 2024-04-29 LAB
ALBUMIN SERPL BCP-MCNC: 4.6 G/DL (ref 3.5–5.2)
ALBUMIN SERPL BCP-MCNC: 4.6 G/DL (ref 3.5–5.2)
ALP SERPL-CCNC: 69 U/L (ref 55–135)
ALP SERPL-CCNC: 69 U/L (ref 55–135)
ALT SERPL W/O P-5'-P-CCNC: 15 U/L (ref 10–44)
ALT SERPL W/O P-5'-P-CCNC: 15 U/L (ref 10–44)
ANION GAP SERPL CALC-SCNC: 10 MMOL/L (ref 8–16)
ANION GAP SERPL CALC-SCNC: 10 MMOL/L (ref 8–16)
AST SERPL-CCNC: 22 U/L (ref 10–40)
AST SERPL-CCNC: 22 U/L (ref 10–40)
BASOPHILS # BLD AUTO: 0.06 K/UL (ref 0–0.2)
BASOPHILS NFR BLD: 0.7 % (ref 0–1.9)
BILIRUB SERPL-MCNC: 1.6 MG/DL (ref 0.1–1)
BILIRUB SERPL-MCNC: 1.6 MG/DL (ref 0.1–1)
BILIRUB UR QL STRIP: NEGATIVE
BUN SERPL-MCNC: 9 MG/DL (ref 6–20)
BUN SERPL-MCNC: 9 MG/DL (ref 6–20)
CALCIUM SERPL-MCNC: 9.5 MG/DL (ref 8.7–10.5)
CALCIUM SERPL-MCNC: 9.5 MG/DL (ref 8.7–10.5)
CHLORIDE SERPL-SCNC: 106 MMOL/L (ref 95–110)
CHLORIDE SERPL-SCNC: 106 MMOL/L (ref 95–110)
CHOLEST SERPL-MCNC: 141 MG/DL (ref 120–199)
CHOLEST/HDLC SERPL: 2.1 {RATIO} (ref 2–5)
CLARITY UR: CLEAR
CO2 SERPL-SCNC: 23 MMOL/L (ref 23–29)
CO2 SERPL-SCNC: 23 MMOL/L (ref 23–29)
COLOR UR: YELLOW
COMPLEXED PSA SERPL-MCNC: 0.19 NG/ML (ref 0–4)
CREAT SERPL-MCNC: 1.1 MG/DL (ref 0.5–1.4)
CREAT SERPL-MCNC: 1.1 MG/DL (ref 0.5–1.4)
DIFFERENTIAL METHOD BLD: ABNORMAL
EOSINOPHIL # BLD AUTO: 0.1 K/UL (ref 0–0.5)
EOSINOPHIL NFR BLD: 0.9 % (ref 0–8)
ERYTHROCYTE [DISTWIDTH] IN BLOOD BY AUTOMATED COUNT: 12.9 % (ref 11.5–14.5)
EST. GFR  (NO RACE VARIABLE): >60 ML/MIN/1.73 M^2
EST. GFR  (NO RACE VARIABLE): >60 ML/MIN/1.73 M^2
ESTIMATED AVG GLUCOSE: 97 MG/DL (ref 68–131)
GLUCOSE SERPL-MCNC: 82 MG/DL (ref 70–110)
GLUCOSE SERPL-MCNC: 82 MG/DL (ref 70–110)
GLUCOSE UR QL STRIP: NEGATIVE
HBA1C MFR BLD: 5 % (ref 4–5.6)
HCT VFR BLD AUTO: 43.3 % (ref 40–54)
HDLC SERPL-MCNC: 67 MG/DL (ref 40–75)
HDLC SERPL: 47.5 % (ref 20–50)
HGB BLD-MCNC: 14.5 G/DL (ref 14–18)
HGB UR QL STRIP: NEGATIVE
IMM GRANULOCYTES # BLD AUTO: 0.02 K/UL (ref 0–0.04)
IMM GRANULOCYTES NFR BLD AUTO: 0.2 % (ref 0–0.5)
KETONES UR QL STRIP: NEGATIVE
LDLC SERPL CALC-MCNC: 58.6 MG/DL (ref 63–159)
LEUKOCYTE ESTERASE UR QL STRIP: NEGATIVE
LYMPHOCYTES # BLD AUTO: 1.3 K/UL (ref 1–4.8)
LYMPHOCYTES NFR BLD: 15.6 % (ref 18–48)
MCH RBC QN AUTO: 30.9 PG (ref 27–31)
MCHC RBC AUTO-ENTMCNC: 33.5 G/DL (ref 32–36)
MCV RBC AUTO: 92 FL (ref 82–98)
MONOCYTES # BLD AUTO: 0.5 K/UL (ref 0.3–1)
MONOCYTES NFR BLD: 6.2 % (ref 4–15)
NEUTROPHILS # BLD AUTO: 6.2 K/UL (ref 1.8–7.7)
NEUTROPHILS NFR BLD: 76.4 % (ref 38–73)
NITRITE UR QL STRIP: NEGATIVE
NONHDLC SERPL-MCNC: 74 MG/DL
NRBC BLD-RTO: 0 /100 WBC
PH UR STRIP: 8 [PH] (ref 5–8)
PLATELET # BLD AUTO: 217 K/UL (ref 150–450)
PMV BLD AUTO: 11.1 FL (ref 9.2–12.9)
POTASSIUM SERPL-SCNC: 4.2 MMOL/L (ref 3.5–5.1)
POTASSIUM SERPL-SCNC: 4.2 MMOL/L (ref 3.5–5.1)
PROT SERPL-MCNC: 7 G/DL (ref 6–8.4)
PROT SERPL-MCNC: 7 G/DL (ref 6–8.4)
PROT UR QL STRIP: NEGATIVE
RBC # BLD AUTO: 4.69 M/UL (ref 4.6–6.2)
SODIUM SERPL-SCNC: 139 MMOL/L (ref 136–145)
SODIUM SERPL-SCNC: 139 MMOL/L (ref 136–145)
SP GR UR STRIP: 1.01 (ref 1–1.03)
TRIGL SERPL-MCNC: 77 MG/DL (ref 30–150)
TSH SERPL DL<=0.005 MIU/L-ACNC: 1.07 UIU/ML (ref 0.4–4)
URN SPEC COLLECT METH UR: NORMAL
UROBILINOGEN UR STRIP-ACNC: NEGATIVE EU/DL
WBC # BLD AUTO: 8.12 K/UL (ref 3.9–12.7)

## 2024-04-29 PROCEDURE — 81002 URINALYSIS NONAUTO W/O SCOPE: CPT | Performed by: FAMILY MEDICINE

## 2024-04-29 PROCEDURE — 3044F HG A1C LEVEL LT 7.0%: CPT | Mod: CPTII,S$GLB,, | Performed by: FAMILY MEDICINE

## 2024-04-29 PROCEDURE — 3074F SYST BP LT 130 MM HG: CPT | Mod: CPTII,S$GLB,, | Performed by: FAMILY MEDICINE

## 2024-04-29 PROCEDURE — 99395 PREV VISIT EST AGE 18-39: CPT | Mod: S$GLB,,, | Performed by: FAMILY MEDICINE

## 2024-04-29 PROCEDURE — 84153 ASSAY OF PSA TOTAL: CPT | Performed by: FAMILY MEDICINE

## 2024-04-29 PROCEDURE — 83036 HEMOGLOBIN GLYCOSYLATED A1C: CPT | Performed by: FAMILY MEDICINE

## 2024-04-29 PROCEDURE — 36415 COLL VENOUS BLD VENIPUNCTURE: CPT | Performed by: FAMILY MEDICINE

## 2024-04-29 PROCEDURE — 3008F BODY MASS INDEX DOCD: CPT | Mod: CPTII,S$GLB,, | Performed by: FAMILY MEDICINE

## 2024-04-29 PROCEDURE — 80061 LIPID PANEL: CPT | Performed by: FAMILY MEDICINE

## 2024-04-29 PROCEDURE — 84443 ASSAY THYROID STIM HORMONE: CPT | Performed by: FAMILY MEDICINE

## 2024-04-29 PROCEDURE — 85025 COMPLETE CBC W/AUTO DIFF WBC: CPT | Performed by: FAMILY MEDICINE

## 2024-04-29 PROCEDURE — 99999 PR PBB SHADOW E&M-EST. PATIENT-LVL III: CPT | Mod: PBBFAC,,, | Performed by: FAMILY MEDICINE

## 2024-04-29 PROCEDURE — 3078F DIAST BP <80 MM HG: CPT | Mod: CPTII,S$GLB,, | Performed by: FAMILY MEDICINE

## 2024-04-29 PROCEDURE — 1159F MED LIST DOCD IN RCRD: CPT | Mod: CPTII,S$GLB,, | Performed by: FAMILY MEDICINE

## 2024-04-29 PROCEDURE — 80053 COMPREHEN METABOLIC PANEL: CPT | Performed by: FAMILY MEDICINE

## 2024-04-29 NOTE — PROGRESS NOTES
Subjective:       Patient ID: Sina Stokes is a 24 y.o. male.    Chief Complaint: Annual Exam, Follow-up (From work incident ( hit his head at the table)), trouble sleeping, urine frequency, and Dizziness    Follow-up        Patient Active Problem List   Diagnosis   (none) - all problems resolved or deleted       No past medical history on file.    Past Surgical History:   Procedure Laterality Date    APPENDECTOMY         Family History   Problem Relation Name Age of Onset    Lung cancer Maternal Grandmother          nonsmoker    Brain cancer Maternal Grandfather      COPD Paternal Grandmother         Social History     Tobacco Use   Smoking Status Some Days    Types: Vaping with nicotine    Start date: 2018   Smokeless Tobacco Never       Wt Readings from Last 5 Encounters:   04/29/24 60.6 kg (133 lb 11.3 oz)   04/28/23 57.5 kg (126 lb 12.2 oz)       For further HPI details, see assessment and plan.    Review of Systems   Neurological:  Positive for dizziness.       Objective:      Vitals:    04/29/24 1451   BP: 120/60   Pulse: 103       Physical Exam  Constitutional:       General: He is not in acute distress.     Appearance: He is not ill-appearing.   Pulmonary:      Effort: Pulmonary effort is normal. No respiratory distress.   Neurological:      General: No focal deficit present.      Mental Status: He is alert.   Psychiatric:         Mood and Affect: Mood normal.         Behavior: Behavior normal.         Assessment:       1. Urinary urgency    2. Annual physical exam        Plan:   Urinary urgency  -     Urinalysis  -     Urinalysis Microscopic; Future; Expected date: 04/29/2024  -     Urine culture; Future; Expected date: 04/29/2024  -     PSA, SCREENING; Future; Expected date: 04/29/2024    Annual physical exam  -     CBC Auto Differential; Future; Expected date: 04/29/2024  -     Comprehensive Metabolic Panel; Future; Expected date: 04/29/2024  -     Hemoglobin A1C; Future; Expected date:  04/29/2024  -     Lipid Panel; Future; Expected date: 04/29/2024  -     TSH; Future; Expected date: 04/29/2024      Patient presents for an annual physical exam and a few issues to discuss   From an annual physical exam perspective plan to update lab work.  Discussed the importance of physical activity.    Weight somewhat improved in body mass index within normal limits presently    Problem based visit   Urinary urgency  Periodically patient has some issues with urination noting cloudy urine in a urinary urgency sensation.  None presently.  We will include a PSA to his lab work in order routine urine studies     Recent head trauma with laceration   Resolved.  No residual affects.  No active concern     Orthostatic hypotension induced dizziness suspected.  Patient feels periodically lightheadedness.  I strongly suspect this is secondary to his thin nature and low blood pressure and would recommend conservative measures including increased hydration and salt intake prior to exploring medication or investigation for pathology.  If this fails to improve with conservative means I want him to reach out and let me know    Trouble in sleeping  Patient generally falls asleep well without difficulty but he finds he awakens early occasionally.  Again conservative measures initially with increased efforts at physical activity and improve sleep hygiene.  Fails to improve we can consider potential pharmaceutical intervention with trial of trazodone as initial recommendation.  I would be okay with as needed use of melatonin          This note was verbally dictated, please excuse any type errors.

## 2024-04-30 ENCOUNTER — PATIENT MESSAGE (OUTPATIENT)
Dept: INTERNAL MEDICINE | Facility: CLINIC | Age: 24
End: 2024-04-30
Payer: COMMERCIAL

## 2024-11-07 ENCOUNTER — IMMUNIZATION (OUTPATIENT)
Dept: INTERNAL MEDICINE | Facility: CLINIC | Age: 24
End: 2024-11-07
Payer: COMMERCIAL

## 2024-11-07 DIAGNOSIS — Z23 NEED FOR VACCINATION: Primary | ICD-10-CM

## 2024-11-07 PROCEDURE — 90471 IMMUNIZATION ADMIN: CPT | Mod: S$GLB,,, | Performed by: INTERNAL MEDICINE

## 2024-11-07 PROCEDURE — 90656 IIV3 VACC NO PRSV 0.5 ML IM: CPT | Mod: S$GLB,,, | Performed by: INTERNAL MEDICINE

## 2025-05-15 ENCOUNTER — OFFICE VISIT (OUTPATIENT)
Dept: INTERNAL MEDICINE | Facility: CLINIC | Age: 25
End: 2025-05-15
Payer: COMMERCIAL

## 2025-05-15 VITALS
TEMPERATURE: 96 F | HEIGHT: 72 IN | DIASTOLIC BLOOD PRESSURE: 70 MMHG | BODY MASS INDEX: 18.16 KG/M2 | HEART RATE: 82 BPM | OXYGEN SATURATION: 99 % | SYSTOLIC BLOOD PRESSURE: 108 MMHG | WEIGHT: 134.06 LBS

## 2025-05-15 DIAGNOSIS — Z00.00 ANNUAL PHYSICAL EXAM: Primary | ICD-10-CM

## 2025-05-15 PROCEDURE — 3074F SYST BP LT 130 MM HG: CPT | Mod: CPTII,S$GLB,, | Performed by: FAMILY MEDICINE

## 2025-05-15 PROCEDURE — 99395 PREV VISIT EST AGE 18-39: CPT | Mod: S$GLB,,, | Performed by: FAMILY MEDICINE

## 2025-05-15 PROCEDURE — 3078F DIAST BP <80 MM HG: CPT | Mod: CPTII,S$GLB,, | Performed by: FAMILY MEDICINE

## 2025-05-15 PROCEDURE — 1159F MED LIST DOCD IN RCRD: CPT | Mod: CPTII,S$GLB,, | Performed by: FAMILY MEDICINE

## 2025-05-15 PROCEDURE — 3008F BODY MASS INDEX DOCD: CPT | Mod: CPTII,S$GLB,, | Performed by: FAMILY MEDICINE

## 2025-05-15 PROCEDURE — 99999 PR PBB SHADOW E&M-EST. PATIENT-LVL III: CPT | Mod: PBBFAC,,, | Performed by: FAMILY MEDICINE

## 2025-05-15 NOTE — PROGRESS NOTES
Subjective:       Patient ID: Sina Stokes is a 25 y.o. male.    Chief Complaint: Annual Exam    HPI    Problem List[1]    History reviewed. No pertinent past medical history.    Past Surgical History:   Procedure Laterality Date    APPENDECTOMY         Family History   Problem Relation Name Age of Onset    Lung cancer Maternal Grandmother          nonsmoker    Brain cancer Maternal Grandfather      COPD Paternal Grandmother         Tobacco Use History[2]    Wt Readings from Last 5 Encounters:   05/15/25 60.8 kg (134 lb 0.6 oz)   04/29/24 60.6 kg (133 lb 11.3 oz)   04/28/23 57.5 kg (126 lb 12.2 oz)     History of Present Illness    CHIEF COMPLAINT:  Patient presents today for annual exam    ENT:  He reports a sinus infection at the beginning of the year with lime green nasal discharge that was treated with antibiotics. He currently experiences lingering sticky mucus. Previous trial of Flonase resulted in worsening pressure symptoms.    GENITOURINARY:  He reports urinary urgency with 1-2 episodes over the past year. He occasionally experiences nocturia which he attributes to drinking water before bedtime.    NEUROLOGIC:  He experiences occasional orthostatic dizziness when transitioning from prone to standing position too quickly. He denies frequent dizziness episodes. He reports a previous head trauma from hitting his head on the corner of a desk at work, denying any residual symptoms or ongoing concerns.    LABS:  Labs from last year showed normal cholesterol and glucose levels with mild, persistent bilirubin elevation.    SOCIAL HISTORY:  He reports occasional vaping (less than weekly), social alcohol consumption (2-3 beers monthly), and caffeine intake in the form of [employer] energy drinks.           Review of Systems    Objective:      Vitals:    05/15/25 0956   BP: 108/70   Pulse: 82   Temp: 96 °F (35.6 °C)       Physical Exam  Constitutional:       General: He is not in acute distress.     Appearance:  "Normal appearance. He is well-developed.   Neck:      Trachea: Trachea normal.   Cardiovascular:      Rate and Rhythm: Normal rate and regular rhythm.      Heart sounds: Normal heart sounds.   Pulmonary:      Effort: Pulmonary effort is normal. No respiratory distress.      Breath sounds: Normal breath sounds. No decreased breath sounds.   Abdominal:      Palpations: Abdomen is soft.   Musculoskeletal:      Cervical back: Full passive range of motion without pain.   Neurological:      Mental Status: He is alert and oriented to person, place, and time.   Psychiatric:         Speech: Speech normal.         Behavior: Behavior normal.         Thought Content: Thought content normal.         Assessment:       1. Annual physical exam        Plan:   Annual physical exam  -     CBC Auto Differential; Future; Expected date: 05/15/2025  -     Hemoglobin A1C; Future; Expected date: 05/15/2025  -     TSH; Future; Expected date: 05/15/2025  -     Lipid Panel; Future; Expected date: 05/15/2025  -     Basic Metabolic Panel; Future; Expected date: 05/15/2025  -     Hepatic Function Panel; Future; Expected date: 05/15/2025      Assessment & Plan    PLAN SUMMARY:  advise fluticasone nasal spray for   Advised incorporating resistance training into exercise routine  Recommend increasing weight to 145-150 lbs  Discussed additional nasal spray options: Astelin and Afrin  Ordered annual exam labs: CBC, A1C, TSH, lipid panel, basic metabolic panel, and full hepatic function panel  Schedule follow-up to reassess weight progress toward 145-150 pound goal    Allergic rhintis  Monitored patient's sinus condition, noting a severe infection earlier this year that required antibiotics.  Lingering issues are mostly resolved, though mucus remains stickier than normal.  Previous symptoms included lime green rhinorrhea indicating bacterial infection.  Prescribed fluticasone nasal spray to reduce sinus inflammation and "open the pipes." Discussed " additional nasal spray options including Astelin (antihistamine) as an alternative treatment, and Afrin (decongestant) with caution about its rebound effect if used for more than 3 days.    ## URINARY URGENCY:  Monitored occasional bouts of urinary urgency over the past year, though this is not a current concern.      ## UNDERWEIGHT:  Evaluated patient's historically low BMI, currently at 18, which remains below normal range.  Long-standing low body weight is likely due to metabolism and potentially insufficient caloric intake.  Advised patient to incorporate resistance training into exercise routine to build muscle mass and recommended increasing weight to 145-150 lbs along with increased caloric intake      ## TOBACCO USE:  Monitored occasional vaping that occurs less than weekly.    ## LIFESTYLE-RELATED ISSUES:  Monitored lifestyle, which includes social alcohol consumption.  Tobacco use addressed in separate section.    ## OTHER MEDICAL CONCERNS:  Assessed persistent mild bilirubin elevation, likely benign.  Ordered annual exam labs: CBC, A1C, TSH, lipid panel, basic metabolic panel, and full hepatic function panel for better liver function evaluation.    ## FOLLOW-UP:  Schedule follow-up to reassess weight progress toward 145-150 pound goal.       12m  This note was verbally dictated, please excuse any type errors.         [1]   Patient Active Problem List  Diagnosis   (none) - all problems resolved or deleted   [2]   Social History  Tobacco Use   Smoking Status Some Days    Types: Vaping with nicotine    Start date: 2018   Smokeless Tobacco Never

## 2025-05-20 ENCOUNTER — LAB VISIT (OUTPATIENT)
Dept: LAB | Facility: HOSPITAL | Age: 25
End: 2025-05-20
Attending: FAMILY MEDICINE
Payer: COMMERCIAL

## 2025-05-20 ENCOUNTER — ON-DEMAND VIRTUAL (OUTPATIENT)
Dept: URGENT CARE | Facility: CLINIC | Age: 25
End: 2025-05-20
Payer: COMMERCIAL

## 2025-05-20 DIAGNOSIS — Z00.00 ANNUAL PHYSICAL EXAM: ICD-10-CM

## 2025-05-20 DIAGNOSIS — J32.9 SINUSITIS, UNSPECIFIED CHRONICITY, UNSPECIFIED LOCATION: Primary | ICD-10-CM

## 2025-05-20 LAB
ABSOLUTE EOSINOPHIL (OHS): 0.85 K/UL
ABSOLUTE MONOCYTE (OHS): 0.58 K/UL (ref 0.3–1)
ABSOLUTE NEUTROPHIL COUNT (OHS): 3.7 K/UL (ref 1.8–7.7)
ALBUMIN SERPL BCP-MCNC: 4.2 G/DL (ref 3.5–5.2)
ALP SERPL-CCNC: 62 UNIT/L (ref 40–150)
ALT SERPL W/O P-5'-P-CCNC: 11 UNIT/L (ref 10–44)
ANION GAP (OHS): 12 MMOL/L (ref 8–16)
AST SERPL-CCNC: 17 UNIT/L (ref 11–45)
BASOPHILS # BLD AUTO: 0.11 K/UL
BASOPHILS NFR BLD AUTO: 1.6 %
BILIRUB DIRECT SERPL-MCNC: 0.6 MG/DL (ref 0.1–0.3)
BILIRUB SERPL-MCNC: 1.5 MG/DL (ref 0.1–1)
BUN SERPL-MCNC: 10 MG/DL (ref 6–20)
CALCIUM SERPL-MCNC: 9 MG/DL (ref 8.7–10.5)
CHLORIDE SERPL-SCNC: 103 MMOL/L (ref 95–110)
CHOLEST SERPL-MCNC: 137 MG/DL (ref 120–199)
CHOLEST/HDLC SERPL: 2.3 {RATIO} (ref 2–5)
CO2 SERPL-SCNC: 23 MMOL/L (ref 23–29)
CREAT SERPL-MCNC: 1.1 MG/DL (ref 0.5–1.4)
EAG (OHS): 100 MG/DL (ref 68–131)
ERYTHROCYTE [DISTWIDTH] IN BLOOD BY AUTOMATED COUNT: 12.5 % (ref 11.5–14.5)
GFR SERPLBLD CREATININE-BSD FMLA CKD-EPI: >60 ML/MIN/1.73/M2
GLUCOSE SERPL-MCNC: 82 MG/DL (ref 70–110)
HBA1C MFR BLD: 5.1 % (ref 4–5.6)
HCT VFR BLD AUTO: 44.4 % (ref 40–54)
HDLC SERPL-MCNC: 59 MG/DL (ref 40–75)
HDLC SERPL: 43.1 % (ref 20–50)
HGB BLD-MCNC: 14.7 GM/DL (ref 14–18)
IMM GRANULOCYTES # BLD AUTO: 0.01 K/UL (ref 0–0.04)
IMM GRANULOCYTES NFR BLD AUTO: 0.1 % (ref 0–0.5)
LDLC SERPL CALC-MCNC: 63.2 MG/DL (ref 63–159)
LYMPHOCYTES # BLD AUTO: 1.82 K/UL (ref 1–4.8)
MCH RBC QN AUTO: 30.9 PG (ref 27–31)
MCHC RBC AUTO-ENTMCNC: 33.1 G/DL (ref 32–36)
MCV RBC AUTO: 94 FL (ref 82–98)
NONHDLC SERPL-MCNC: 78 MG/DL
NUCLEATED RBC (/100WBC) (OHS): 0 /100 WBC
PLATELET # BLD AUTO: 241 K/UL (ref 150–450)
PMV BLD AUTO: 10.9 FL (ref 9.2–12.9)
POTASSIUM SERPL-SCNC: 4 MMOL/L (ref 3.5–5.1)
PROT SERPL-MCNC: 7 GM/DL (ref 6–8.4)
RBC # BLD AUTO: 4.75 M/UL (ref 4.6–6.2)
RELATIVE EOSINOPHIL (OHS): 12 %
RELATIVE LYMPHOCYTE (OHS): 25.7 % (ref 18–48)
RELATIVE MONOCYTE (OHS): 8.2 % (ref 4–15)
RELATIVE NEUTROPHIL (OHS): 52.4 % (ref 38–73)
SODIUM SERPL-SCNC: 138 MMOL/L (ref 136–145)
TRIGL SERPL-MCNC: 74 MG/DL (ref 30–150)
TSH SERPL-ACNC: 1.61 UIU/ML (ref 0.4–4)
WBC # BLD AUTO: 7.07 K/UL (ref 3.9–12.7)

## 2025-05-20 PROCEDURE — 83036 HEMOGLOBIN GLYCOSYLATED A1C: CPT

## 2025-05-20 PROCEDURE — 84443 ASSAY THYROID STIM HORMONE: CPT

## 2025-05-20 PROCEDURE — 36415 COLL VENOUS BLD VENIPUNCTURE: CPT

## 2025-05-20 PROCEDURE — 82248 BILIRUBIN DIRECT: CPT

## 2025-05-20 PROCEDURE — 82310 ASSAY OF CALCIUM: CPT

## 2025-05-20 PROCEDURE — 80061 LIPID PANEL: CPT

## 2025-05-20 PROCEDURE — 85025 COMPLETE CBC W/AUTO DIFF WBC: CPT

## 2025-05-20 RX ORDER — AZELASTINE 1 MG/ML
2 SPRAY, METERED NASAL 2 TIMES DAILY
Qty: 30 ML | Refills: 0 | Status: SHIPPED | OUTPATIENT
Start: 2025-05-20 | End: 2026-05-20

## 2025-05-20 RX ORDER — AMOXICILLIN AND CLAVULANATE POTASSIUM 875; 125 MG/1; MG/1
1 TABLET, FILM COATED ORAL EVERY 12 HOURS
Qty: 14 TABLET | Refills: 0 | Status: SHIPPED | OUTPATIENT
Start: 2025-05-20 | End: 2025-05-27

## 2025-05-20 NOTE — PROGRESS NOTES
Subjective:      Patient ID: Sina Stokes is a 25 y.o. male.    Vitals:  vitals were not taken for this visit.     Chief Complaint: Sinus Problem (Congestion and pain; concerned for infection)      Visit Type: TELE AUDIOVISUAL    Patient Location: Work     Present with the patient at the time of consultation: TELEMED PRESENT WITH PATIENT: None    History reviewed. No pertinent past medical history.  Past Surgical History:   Procedure Laterality Date    APPENDECTOMY       Review of patient's allergies indicates:  No Known Allergies  Medications Ordered Prior to Encounter[1]  Family History   Problem Relation Name Age of Onset    Lung cancer Maternal Grandmother          nonsmoker    Brain cancer Maternal Grandfather      COPD Paternal Grandmother         Medications Ordered                [facility]   [address] [facility] [address]    Telephone: [phone]   Fax: [phone]   Hours: Mon-Fri, 8a-5:30p                         Internal Pharmacy (2 of 2)              amoxicillin-clavulanate 875-125mg (AUGMENTIN) 875-125 mg per tablet    Sig: Take 1 tablet by mouth every 12 (twelve) hours. for 7 days       Start: 25     Quantity: 14 tablet Refills: 0                         azelastine (ASTELIN) 137 mcg (0.1 %) nasal spray    Si sprays (274 mcg total) by Nasal route 2 (two) times daily.       Start: 25     Quantity: 30 mL Refills: 0                           Ohs Peq Odvv Intake    2025 10:38 AM CDT - Filed by Patient   What is your current physical address in the event of a medical emergency? [address] [place] [address]   Are you able to take your vital signs? No   Please attach any relevant images or files    Is your employer contracted with [facility]? No         Sinus symptoms for 1 week. Allergy flare. +Sinus drainage, and pressure. No relief with OTC meds. Hx of sinus infections with similar presentation.    Sinus Problem  Associated  symptoms include congestion, sinus pressure and a sore throat. Pertinent negatives include no chills, coughing, headaches or shortness of breath.       Constitution: Negative for chills and fever.   HENT:  Positive for congestion, postnasal drip, sinus pain, sinus pressure and sore throat. Negative for trouble swallowing and voice change.    Respiratory:  Negative for cough, shortness of breath and wheezing.    Gastrointestinal:  Negative for nausea, vomiting and diarrhea.   Musculoskeletal:  Negative for muscle ache.   Allergic/Immunologic: Positive for seasonal allergies and recurrent sinus infections.   Neurological:  Negative for headaches.        Objective:   The physical exam was conducted virtually.  Physical Exam   Constitutional: He is oriented to person, place, and time. He does not appear ill. No distress.   HENT:   Head: Normocephalic and atraumatic.   Nose: Right sinus exhibits maxillary sinus tenderness. Left sinus exhibits maxillary sinus tenderness.   Eyes: Extraocular movement intact   Pulmonary/Chest: Effort normal.   Abdominal: Normal appearance.   Musculoskeletal: Normal range of motion.         General: Normal range of motion.   Neurological: no focal deficit. He is alert and oriented to person, place, and time.   Psychiatric: His behavior is normal. Mood normal.   Vitals reviewed.      Assessment:     1. Sinusitis, unspecified chronicity, unspecified location        Plan:     Patient encouraged to monitor symptoms closely and instructed to follow-up for new or worsening symptoms. Further, in-person, evaluation may be necessary for continued treatment. Please follow up with your primary care doctor or specialist as needed. Verbally discussed plan. Patient confirms understanding and is in agreement with treatment and plan.     You must understand that you've received a [facility] evaluation only and that you may be released before all your medical problems are known or treated. You, the  patient, will arrange for follow up care as instructed.    Sinusitis, unspecified chronicity, unspecified location  -     azelastine (ASTELIN) 137 mcg (0.1 %) nasal spray; 2 sprays (274 mcg total) by Nasal route 2 (two) times daily.  Dispense: 30 mL; Refill: 0  -     amoxicillin-clavulanate 875-125mg (AUGMENTIN) 875-125 mg per tablet; Take 1 tablet by mouth every 12 (twelve) hours. for 7 days  Dispense: 14 tablet; Refill: 0        Patient Instructions   OVER THE COUNTER RECOMMENDATIONS/SUGGESTIONS (IF NO CONTRAINDICATIONS).     ·         Make sure to stay well hydrated.     ·         Use Nasal Saline to mechanically move any post nasal drip from your eustachian tube or from the back of your throat.     ·         Use warm saltwater gargles to ease your throat pain. Warm saltwater gargles as needed for sore throat-  1/2 tsp salt to 1 cup warm water, gargle as desired. Warm fluids tend to relieve a sore throat.     .         Throat lozenges, Chloraseptic spray or other over the counter treatments are ok to use as well. Use as directed.     ·         Use an antihistamine such as Claritin, Zyrtec or Allegra to dry you out.     ·         Use pseudoephedrine (behind the counter) to decongest. Pseudoephedrine  30 mg up to 240 mg /day. It can raise your blood pressure and give you palpitations.     ·         Use Mucinex (guaifenesin) to break up mucous up to 2400mg/day to loosen any mucous.     ·         The Mucinex DM pill has a cough suppressant that can be sedating. It can be used at night to stop the tickle at the back of your throat.     ·         You can use Mucinex D (it has guaifenesin and a high dose of pseudoephedrine) in the mornings to help decongest.     ·         Use Afrin (oxymetazoline) in each nare for no longer than 3 days, as it is addictive. It can also dry out your mucous membranes and cause elevated blood pressure. This is especially useful if you are flying.     ·         Use Flonase 1-2 sprays/nostril  per day. It is a local acting steroid nasal spray, if you develop a bloody nose, stop using the medication immediately.     ·         Sometimes Nyquil at night is beneficial to help you get some rest, however it is sedating, and it does have an antihistamine, and Tylenol.     ·         Honey is a natural cough suppressant that can be used.     ·         Tylenol up to 4,000 mg a day is safe for short periods and can be used for body aches, pain, and fever. However, in high doses and prolonged use it can cause liver irritation.     ·         Ibuprofen is a non-steroidal anti-inflammatory that can be used for body aches, pain, and fever. However, it can also cause stomach irritation if overused.                           [1]   Current Outpatient Medications on File Prior to Visit   Medication Sig Dispense Refill    dicyclomine (BENTYL) 20 mg tablet Take 1 tablet (20 mg total) by mouth every 6 (six) hours as needed. 90 tablet 0     No current facility-administered medications on file prior to visit.

## 2025-05-21 ENCOUNTER — RESULTS FOLLOW-UP (OUTPATIENT)
Dept: INTERNAL MEDICINE | Facility: CLINIC | Age: 25
End: 2025-05-21

## 2025-06-02 ENCOUNTER — OFFICE VISIT (OUTPATIENT)
Dept: OTOLARYNGOLOGY | Facility: CLINIC | Age: 25
End: 2025-06-02
Payer: COMMERCIAL

## 2025-06-02 DIAGNOSIS — J34.89 RHINORRHEA: ICD-10-CM

## 2025-06-02 DIAGNOSIS — J34.89 SINUS PRESSURE: Primary | ICD-10-CM

## 2025-06-02 PROCEDURE — 99999 PR PBB SHADOW E&M-EST. PATIENT-LVL II: CPT | Mod: PBBFAC,,, | Performed by: PHYSICIAN ASSISTANT

## 2025-06-02 PROCEDURE — 3044F HG A1C LEVEL LT 7.0%: CPT | Mod: CPTII,S$GLB,, | Performed by: PHYSICIAN ASSISTANT

## 2025-06-02 PROCEDURE — 99204 OFFICE O/P NEW MOD 45 MIN: CPT | Mod: S$GLB,,, | Performed by: PHYSICIAN ASSISTANT

## 2025-06-02 PROCEDURE — 1159F MED LIST DOCD IN RCRD: CPT | Mod: CPTII,S$GLB,, | Performed by: PHYSICIAN ASSISTANT

## 2025-06-03 ENCOUNTER — HOSPITAL ENCOUNTER (OUTPATIENT)
Dept: RADIOLOGY | Facility: HOSPITAL | Age: 25
Discharge: HOME | End: 2025-06-03
Attending: PHYSICIAN ASSISTANT
Payer: COMMERCIAL

## 2025-06-03 DIAGNOSIS — J34.89 SINUS PRESSURE: ICD-10-CM

## 2025-06-03 PROCEDURE — 70220 X-RAY EXAM OF SINUSES: CPT | Mod: TC

## 2025-06-03 PROCEDURE — 70220 X-RAY EXAM OF SINUSES: CPT | Mod: 26,,, | Performed by: RADIOLOGY

## 2025-06-04 ENCOUNTER — TELEPHONE (OUTPATIENT)
Dept: OTOLARYNGOLOGY | Facility: CLINIC | Age: 25
End: 2025-06-04
Payer: COMMERCIAL

## 2025-06-05 ENCOUNTER — PATIENT MESSAGE (OUTPATIENT)
Dept: OTOLARYNGOLOGY | Facility: CLINIC | Age: 25
End: 2025-06-05
Payer: COMMERCIAL

## 2025-06-13 ENCOUNTER — OFFICE VISIT (OUTPATIENT)
Dept: OTOLARYNGOLOGY | Facility: CLINIC | Age: 25
End: 2025-06-13
Payer: COMMERCIAL

## 2025-06-13 VITALS — WEIGHT: 137.38 LBS | HEIGHT: 72 IN | BODY MASS INDEX: 18.61 KG/M2

## 2025-06-13 DIAGNOSIS — J34.3 HYPERTROPHY OF BOTH INFERIOR NASAL TURBINATES: ICD-10-CM

## 2025-06-13 DIAGNOSIS — J31.0 CHRONIC RHINITIS: ICD-10-CM

## 2025-06-13 DIAGNOSIS — J34.2 NASAL SEPTAL DEVIATION: Primary | ICD-10-CM

## 2025-06-13 PROCEDURE — 99999 PR PBB SHADOW E&M-EST. PATIENT-LVL III: CPT | Mod: PBBFAC,,, | Performed by: STUDENT IN AN ORGANIZED HEALTH CARE EDUCATION/TRAINING PROGRAM

## 2025-06-13 NOTE — PROGRESS NOTES
Subjective:   Patient ID: Sina Stokes is a 25 y.o. male.    Chief Complaint: Sinus Problem (Pt reports that it feels like her has a chronic sinus infection. R side of his nasal is worse than his left. He also reports sinus pressure.)    History of Present Illness    History of Present Illness    Patient presents today for recurrent right-sided sinus infection with nasal drainage. He experienced his first bilateral sinus infection in December with bright green discharge, treated with a 7-10 day course of Augmentin prescribed by his sister, a PA. A second infection occurred 1-2 months later, isolated to the right side with bright green mucus, which he self-managed with OTC medications. His current right-sided infection began after a recent primary care visit, characterized by extreme sinus pressure and clear, thin, salty discharge from the right nostril. He describes inability to sneeze despite the sensation, with symptoms worsening when supine. He recently completed a second course of Augmentin. Prior to December, he denies any significant sinus problems. He takes Claritin for symptom management between infections and was recently prescribed Flonase. He discontinued Sudafed and Tylenol Cold and Flu due to symptom exacerbation, particularly on the right side. He denies history of nasal polyps or significant sinus problems prior to December. He denies daily use of allergy medication before the onset of these symptoms.     Return clinic visit, 6/13/25  Since last visit the rhinorrhea has improved. However, he has persistent right nasal obstruction, congestion, facial pressure.                Review of patient's allergies indicates:  No Known Allergies        Review of Systems   Constitutional: Negative.  Negative for fatigue, fever and unexpected weight change.   HENT:  Positive for congestion (right sided), rhinorrhea (clear, watery, salty), sinus pressure and sinus pain. Negative for ear discharge, ear pain,  facial swelling, hearing loss, nosebleeds, postnasal drip, sneezing, sore throat, tinnitus, trouble swallowing and voice change.    Eyes:  Negative for discharge, redness and itching.   Respiratory: Negative.  Negative for cough, choking, shortness of breath and wheezing.    Cardiovascular: Negative.  Negative for chest pain and palpitations.   Gastrointestinal: Negative.  Negative for abdominal pain.        No reflux.   Endocrine: Negative.    Genitourinary: Negative.    Musculoskeletal: Negative.  Negative for neck pain.   Skin: Negative.    Neurological: Negative.  Negative for dizziness, facial asymmetry, light-headedness and headaches.   Hematological: Negative.  Negative for adenopathy. Does not bruise/bleed easily.   Psychiatric/Behavioral:  Positive for sleep disturbance. Negative for agitation, behavioral problems, confusion and decreased concentration.          Objective:   Ht 6' (1.829 m)   Wt 62.3 kg (137 lb 5.6 oz)   BMI 18.63 kg/m²     Physical Exam  Constitutional:       General: He is not in acute distress.     Appearance: He is well-developed.   HENT:      Head: Normocephalic and atraumatic.      Jaw: No trismus.      Right Ear: Hearing, tympanic membrane, ear canal and external ear normal.      Left Ear: Hearing, tympanic membrane, ear canal and external ear normal.      Nose: Septal deviation (moderate) present. No nasal deformity, mucosal edema or rhinorrhea.      Mouth/Throat:      Dentition: Normal dentition.      Pharynx: Uvula midline. No oropharyngeal exudate or uvula swelling.   Eyes:      General: No scleral icterus.     Conjunctiva/sclera: Conjunctivae normal.      Right eye: Right conjunctiva is not injected. No chemosis.     Left eye: Left conjunctiva is not injected. No chemosis.     Pupils: Pupils are equal, round, and reactive to light.   Neck:      Thyroid: No thyroid mass or thyromegaly.      Trachea: Trachea and phonation normal. No tracheal tenderness or tracheal deviation.    Pulmonary:      Effort: Pulmonary effort is normal. No accessory muscle usage or respiratory distress.      Breath sounds: No stridor.   Lymphadenopathy:      Head:      Right side of head: No submental, submandibular, preauricular or posterior auricular adenopathy.      Left side of head: No submental, submandibular, preauricular or posterior auricular adenopathy.      Cervical: No cervical adenopathy.      Right cervical: No superficial or deep cervical adenopathy.     Left cervical: No superficial or deep cervical adenopathy.   Skin:     General: Skin is warm and dry.      Findings: No erythema or rash.   Neurological:      Mental Status: He is alert and oriented to person, place, and time.      Cranial Nerves: No cranial nerve deficit.   Psychiatric:         Behavior: Behavior normal.         Thought Content: Thought content normal.          Data review    XR sinus 6/3/25  Clear paranasal sinuses       NASAL ENDOSCOPY       Indication: Sina Stokes is a 25 y.o. male  with sinonasal symptoms that were not able to be explained by anterior rhinoscopy alone, thus necessitating nasal endoscopy.     Procedure: Risks, benefits, and alternatives of the procedure were discussed with the patient, and the patient consented to the nasal endoscopy.  The nasal cavity was sprayed with a topical decongestant and anesthetic (if needed). The endoscope was passed into each nostril and each nasal cavity was visualized.  On each side the nasal cavity, sinuses (if open), turbinates, middle and superior meatus, sphenoethmoidal recess and septum were examined with the findings described below. At the end of the examination, the scope was removed. The patient tolerated the procedure well with no complications.       Endoscopic Sinonasal Exam Findings:  -     The right side has normal mucosa  -     The left side has normal mucosa  -     Nasal secretions: No discolored secretions noted bilaterally  -     Nasal septum: RIGHT  moderate to severe deviation   -     Inferior turbinate: hypertrophy or edema (Moderate) and Normal mucosa without significant hypertrophy bilaterally  -     Middle turbinate: Normal mucosa without significant hypertrophy bilaterally  -     Other findings: none      Assessment & Plan      I suspect Sina Beavers developed a chronic sinus infection after smoke exposure about 6 months ago, which does finally appear to be resolving, however, his prolonged symptoms of nasal obstruction and pressure are secondary to his septal deviation and inferior turbinate hypertrophy. We discussed continued saline irrigations, Astelin, and monitoring of symptoms over the next 1-2 months. If not resolved, then we will plan for septoplasty and inferior turbinate reduction.